# Patient Record
Sex: FEMALE | Race: WHITE | Employment: FULL TIME | URBAN - METROPOLITAN AREA
[De-identification: names, ages, dates, MRNs, and addresses within clinical notes are randomized per-mention and may not be internally consistent; named-entity substitution may affect disease eponyms.]

---

## 2024-02-06 ENCOUNTER — OFFICE VISIT (OUTPATIENT)
Dept: VASCULAR SURGERY | Age: 69
End: 2024-02-06
Payer: COMMERCIAL

## 2024-02-06 VITALS — DIASTOLIC BLOOD PRESSURE: 88 MMHG | SYSTOLIC BLOOD PRESSURE: 138 MMHG | HEART RATE: 89 BPM

## 2024-02-06 DIAGNOSIS — I72.8 SPLENIC ARTERY ANEURYSM (HCC): Primary | ICD-10-CM

## 2024-02-06 PROCEDURE — 99203 OFFICE O/P NEW LOW 30 MIN: CPT | Performed by: SURGERY

## 2024-02-06 PROCEDURE — 1123F ACP DISCUSS/DSCN MKR DOCD: CPT | Performed by: SURGERY

## 2024-02-06 RX ORDER — MEDROXYPROGESTERONE ACETATE 5 MG/1
TABLET ORAL
COMMUNITY

## 2024-02-06 RX ORDER — BUTALBITAL, ASPIRIN, AND CAFFEINE 325; 50; 40 MG/1; MG/1; MG/1
CAPSULE ORAL
COMMUNITY
Start: 2023-11-10

## 2024-02-06 RX ORDER — ESTRADIOL 0.5 MG/1
0.5 TABLET ORAL DAILY
COMMUNITY

## 2024-02-06 RX ORDER — DICYCLOMINE HCL 20 MG
20 TABLET ORAL 2 TIMES DAILY
COMMUNITY

## 2024-02-06 RX ORDER — OMEPRAZOLE 40 MG/1
40 CAPSULE, DELAYED RELEASE ORAL DAILY
COMMUNITY
Start: 2023-12-07

## 2024-02-06 RX ORDER — PRAVASTATIN SODIUM 80 MG/1
80 TABLET ORAL DAILY
COMMUNITY

## 2024-02-06 RX ORDER — ALENDRONATE SODIUM 70 MG/1
70 TABLET ORAL
COMMUNITY

## 2024-02-06 NOTE — PROGRESS NOTES
Vascular Surgery Outpatient Consultation      Chief Complaint   Patient presents with    Surgical Consult     Calcified splenic artery aneurysm.       Reason for Consult: Splenic artery aneurysm    Requesting Physician:  Dr. Foley    HISTORY OF PRESENT ILLNESS:                The patient is a 68 y.o. female who is referred for evaluation of splenic artery aneurysm.  The patient underwent a CAT scan of the abdomen and pelvis for evaluation of microscopic hematuria.  The CAT scan incidentally identified a 1.4 cm aneurysm involving the splenic artery that was calcified.  The patient denies previous knowledge of the aneurysm.    Past Medical History:        Diagnosis Date    History of IBS     Hyperlipidemia     Osteopenia      Past Surgical History:        Procedure Laterality Date    BREAST SURGERY Right     for microcalcifications    FEMORAL HERNIA REPAIR      TUBAL LIGATION       Current Medications:   Prior to Admission medications    Medication Sig Start Date End Date Taking? Authorizing Provider   alendronate (FOSAMAX) 70 MG tablet Take 1 tablet by mouth every 7 days   Yes Handy Schaeffer MD   dicyclomine (BENTYL) 20 MG tablet Take 1 tablet by mouth 2 times daily   Yes Handy Schaeffer MD   medroxyPROGESTERone (PROVERA) 5 MG tablet    Yes Handy Schaeffer MD   estradiol (ESTRACE) 0.5 MG tablet Take 1 tablet by mouth daily   Yes Handy Schaeffer MD   pravastatin (PRAVACHOL) 80 MG tablet Take 1 tablet by mouth daily   Yes Handy Schaeffer MD   butalbital-aspirin-caffeine (FIORINAL) -40 MG capsule TAKE 1 CAPSULE BY MOUTH EVERY 8 HOURS AS NEEDED 11/10/23  Yes Handy Schaeffer MD   omeprazole (PRILOSEC) 40 MG delayed release capsule Take 1 capsule by mouth daily 12/7/23  Yes Handy Schaeffer MD   Calcium Carb-Cholecalciferol (CALCIUM/VITAMIN D PO) Take by mouth   Yes Handy Schaeffer MD   Cyanocobalamin (VITAMIN B 12 PO) Take by mouth   Yes Handy Schaeffer MD